# Patient Record
Sex: FEMALE | Race: WHITE | NOT HISPANIC OR LATINO
[De-identification: names, ages, dates, MRNs, and addresses within clinical notes are randomized per-mention and may not be internally consistent; named-entity substitution may affect disease eponyms.]

---
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
## 2024-03-04 ENCOUNTER — WALK IN (OUTPATIENT)
Dept: URGENT CARE | Age: 19
End: 2024-03-04
Attending: EMERGENCY MEDICINE

## 2024-03-04 VITALS
OXYGEN SATURATION: 100 % | DIASTOLIC BLOOD PRESSURE: 79 MMHG | WEIGHT: 100 LBS | TEMPERATURE: 98.7 F | RESPIRATION RATE: 16 BRPM | SYSTOLIC BLOOD PRESSURE: 119 MMHG | HEART RATE: 92 BPM

## 2024-03-04 DIAGNOSIS — J02.9 ACUTE PHARYNGITIS, UNSPECIFIED ETIOLOGY: Primary | ICD-10-CM

## 2024-03-04 DIAGNOSIS — R07.0 THROAT PAIN: ICD-10-CM

## 2024-03-04 LAB
FLUAV RNA RESP QL NAA+PROBE: NOT DETECTED
FLUBV RNA RESP QL NAA+PROBE: NOT DETECTED
RSV AG NPH QL IA.RAPID: NOT DETECTED
S PYO DNA THROAT QL NAA+PROBE: NOT DETECTED
SARS-COV-2 RNA RESP QL NAA+PROBE: NOT DETECTED
TEST LOT EXPIRATION DATE: NORMAL
TEST LOT NUMBER: NORMAL

## 2024-03-04 PROCEDURE — 99202 OFFICE O/P NEW SF 15 MIN: CPT

## 2024-03-04 PROCEDURE — 87651 STREP A DNA AMP PROBE: CPT | Performed by: PHYSICIAN ASSISTANT

## 2024-03-04 PROCEDURE — 0241U POCT COVID/FLU/RSV PANEL: CPT | Performed by: PHYSICIAN ASSISTANT

## 2024-03-04 RX ORDER — ESCITALOPRAM OXALATE 10 MG/1
10 TABLET ORAL DAILY
COMMUNITY
Start: 2024-01-26

## 2024-03-04 ASSESSMENT — PAIN SCALES - GENERAL: PAINLEVEL_OUTOF10: 3

## 2024-10-01 ENCOUNTER — WALK IN (OUTPATIENT)
Dept: URGENT CARE | Age: 19
End: 2024-10-01
Attending: EMERGENCY MEDICINE

## 2024-10-01 VITALS
SYSTOLIC BLOOD PRESSURE: 117 MMHG | HEART RATE: 80 BPM | RESPIRATION RATE: 16 BRPM | OXYGEN SATURATION: 97 % | DIASTOLIC BLOOD PRESSURE: 79 MMHG | TEMPERATURE: 99.4 F

## 2024-10-01 DIAGNOSIS — L73.8 PSEUDOFOLLICULITIS: Primary | ICD-10-CM

## 2024-10-01 PROCEDURE — 99212 OFFICE O/P EST SF 10 MIN: CPT

## 2024-10-01 RX ORDER — MUPIROCIN 20 MG/G
OINTMENT TOPICAL 3 TIMES DAILY
Qty: 22 G | Refills: 1 | Status: SHIPPED | OUTPATIENT
Start: 2024-10-01 | End: 2024-10-08

## 2024-10-01 ASSESSMENT — PAIN SCALES - GENERAL
PAINLEVEL_OUTOF10: 4
PAINLEVEL: 4
PAINLEVEL: 4
PAINLEVEL_OUTOF10: 4

## 2025-03-17 ENCOUNTER — OFFICE VISIT (OUTPATIENT)
Dept: URGENT CARE | Age: 20
End: 2025-03-17
Payer: COMMERCIAL

## 2025-03-17 VITALS
WEIGHT: 103 LBS | SYSTOLIC BLOOD PRESSURE: 113 MMHG | BODY MASS INDEX: 18.25 KG/M2 | OXYGEN SATURATION: 98 % | HEART RATE: 105 BPM | DIASTOLIC BLOOD PRESSURE: 77 MMHG | RESPIRATION RATE: 19 BRPM | TEMPERATURE: 98.2 F | HEIGHT: 63 IN

## 2025-03-17 DIAGNOSIS — J06.0 LARYNGOPHARYNGITIS ACUTE: ICD-10-CM

## 2025-03-17 DIAGNOSIS — J02.9 SORE THROAT: Primary | ICD-10-CM

## 2025-03-17 LAB
POC CORONAVIRUS SARS-COV-2 PCR: NEGATIVE
POC HUMAN RHINOVIRUS PCR: NEGATIVE
POC HUMAN RHINOVIRUS PCR: NEGATIVE
POC INFLUENZA A VIRUS PCR: NEGATIVE
POC INFLUENZA A VIRUS PCR: NEGATIVE
POC INFLUENZA B VIRUS PCR: NEGATIVE
POC INFLUENZA B VIRUS PCR: NEGATIVE
POC RESPIRATORY SYNCYTIAL VIRUS PCR: NEGATIVE
POC RESPIRATORY SYNCYTIAL VIRUS PCR: NEGATIVE
POC STREPTOCOCCUS PYOGENES (GROUP A STREP) PCR: NEGATIVE

## 2025-03-17 PROCEDURE — 99203 OFFICE O/P NEW LOW 30 MIN: CPT | Performed by: SPECIALIST

## 2025-03-17 PROCEDURE — 87631 RESP VIRUS 3-5 TARGETS: CPT | Performed by: SPECIALIST

## 2025-03-17 PROCEDURE — 3008F BODY MASS INDEX DOCD: CPT | Performed by: SPECIALIST

## 2025-03-17 PROCEDURE — 87651 STREP A DNA AMP PROBE: CPT | Performed by: SPECIALIST

## 2025-03-17 PROCEDURE — 1036F TOBACCO NON-USER: CPT | Performed by: SPECIALIST

## 2025-03-17 RX ORDER — ESCITALOPRAM OXALATE 5 MG/5ML
10 SOLUTION ORAL DAILY
COMMUNITY
Start: 2024-12-02

## 2025-03-17 ASSESSMENT — ENCOUNTER SYMPTOMS
SORE THROAT: 1
SINUS COMPLAINT: 1
VOICE CHANGE: 1
COUGH: 1

## 2025-03-17 NOTE — PROGRESS NOTES
"Subjective   Patient ID: Cherelle Desai is a 19 y.o. female. They present today with a chief complaint of Sore Throat, Cough, Earache, and Sinus Problem.    History of Present Illness    Sore Throat   Associated symptoms include congestion, coughing and ear pain.   Cough  Associated symptoms include ear pain and a sore throat.   Earache   Associated symptoms include coughing and a sore throat.   Sinus Problem  Associated symptoms: congestion, cough, ear pain and sore throat        Past Medical History  Allergies as of 03/17/2025 - Reviewed 03/17/2025   Allergen Reaction Noted    Oseltamivir Hallucinations 02/08/2018       (Not in a hospital admission)       No past medical history on file.    No past surgical history on file.     reports that she has never smoked. She has never used smokeless tobacco. She reports that she does not drink alcohol and does not use drugs.    Review of Systems  Review of Systems   HENT:  Positive for congestion, ear pain, sore throat and voice change.    Respiratory:  Positive for cough.                                   Objective    Vitals:    03/17/25 1118   BP: 113/77   Pulse: 105   Resp: 19   Temp: 36.8 °C (98.2 °F)   TempSrc: Oral   SpO2: 98%   Weight: 46.7 kg (103 lb)   Height: 1.6 m (5' 3\")     Patient's last menstrual period was 03/06/2025 (exact date).    Physical Exam  HENT:      Right Ear: Tympanic membrane and ear canal normal.      Left Ear: Tympanic membrane and ear canal normal.      Nose: Nose normal.      Mouth/Throat:      Pharynx: Pharyngeal swelling and posterior oropharyngeal erythema present.      Comments: Mild to moderate swelling of bilateral tonsils  Neurological:      Mental Status: She is alert.         Procedures    Point of Care Test & Imaging Results from this visit  No results found for this visit on 03/17/25.   No results found.    Diagnostic study results (if any) were reviewed by Marsha Boo MD.    Assessment/Plan   Allergies, medications, " history, and pertinent labs/EKGs/Imaging reviewed by Marsha Boo MD.     Medical Decision Making  LaryngoPharyngitis, possibly viral, symptom therapy     Orders and Diagnoses  There are no diagnoses linked to this encounter.    Medical Admin Record      Patient disposition: Home    Electronically signed by Marsha Boo MD  11:38 AM

## 2025-03-17 NOTE — PATIENT INSTRUCTIONS
Fluids   Gargle with warm salt water   400 milligram Ibuprofen as needed up to 3 times a day .   Mucinex as needed for cough up to 2 times a day.

## 2025-04-24 ENCOUNTER — HOSPITAL ENCOUNTER (OUTPATIENT)
Dept: LAB | Age: 20
Discharge: HOME | End: 2025-04-24
Attending: PEDIATRICS

## 2025-04-24 DIAGNOSIS — R07.89 ATYPICAL CHEST PAIN: Primary | ICD-10-CM

## 2025-04-24 DIAGNOSIS — R07.89 ATYPICAL CHEST PAIN: ICD-10-CM

## 2025-04-24 PROCEDURE — 93005 ELECTROCARDIOGRAM TRACING: CPT | Performed by: PEDIATRICS

## 2025-04-25 LAB
ATRIAL RATE (BPM): 77
P AXIS (DEGREES): 61
PR-INTERVAL (MSEC): 128
QRS-INTERVAL (MSEC): 76
QT-INTERVAL (MSEC): 368
QTC: 416
R AXIS (DEGREES): 61
REPORT TEXT: NORMAL
T AXIS (DEGREES): 39
VENTRICULAR RATE EKG/MIN (BPM): 77

## 2025-06-01 ENCOUNTER — HOSPITAL ENCOUNTER (EMERGENCY)
Facility: HOSPITAL | Age: 20
Discharge: HOME | End: 2025-06-01
Attending: EMERGENCY MEDICINE
Payer: COMMERCIAL

## 2025-06-01 ENCOUNTER — APPOINTMENT (OUTPATIENT)
Dept: GENERAL RADIOLOGY | Facility: HOSPITAL | Age: 20
End: 2025-06-01
Attending: EMERGENCY MEDICINE
Payer: COMMERCIAL

## 2025-06-01 VITALS
TEMPERATURE: 98 F | SYSTOLIC BLOOD PRESSURE: 132 MMHG | HEIGHT: 63 IN | HEART RATE: 74 BPM | DIASTOLIC BLOOD PRESSURE: 74 MMHG | WEIGHT: 102 LBS | OXYGEN SATURATION: 100 % | RESPIRATION RATE: 16 BRPM | BODY MASS INDEX: 18.07 KG/M2

## 2025-06-01 DIAGNOSIS — R13.10 DYSPHAGIA, UNSPECIFIED TYPE: Primary | ICD-10-CM

## 2025-06-01 PROCEDURE — 70360 X-RAY EXAM OF NECK: CPT | Performed by: EMERGENCY MEDICINE

## 2025-06-01 PROCEDURE — 99283 EMERGENCY DEPT VISIT LOW MDM: CPT

## 2025-06-01 PROCEDURE — 99284 EMERGENCY DEPT VISIT MOD MDM: CPT

## 2025-06-01 NOTE — DISCHARGE INSTRUCTIONS
I recommend following up with gastroenterology for further evaluation.  Call for appointment  Follow-up with PMD as needed.  Return if increased concerns.

## 2025-06-01 NOTE — ED PROVIDER NOTES
Patient Seen in: [facility] Emergency Department        History  Chief Complaint   Patient presents with    FB in Throat     Stated Complaint: Coughed something up this morning.    Subjective:   HPI            Patient is a 19-year-old who said that she woke up this morning with a sensation of something in the back of her throat and she coughed up a few mucousy pieces of food.  She said she still feels a little discomfort when she swallows now.  Limited to the right of midline.  She has been able to drink water this morning without difficulty.  She has had no significant coughing or shortness of breath.  No fever.    Patient says she has had a problem in the past where she takes too big of a bite of some food it seems to have a difficulty going down completely.  Patient says she has never had a complete food bolus obstruction.    She also says she has had a little bit of problem with GE reflux in the past.  She has never seen a gastroenterologist.      Objective:     History reviewed. No pertinent past medical history.           Past Surgical History:   Procedure Laterality Date    Unlisted proc, laparoscopy, uterus  2021                Social History     Socioeconomic History    Marital status: Single   Tobacco Use    Smoking status: Never    Smokeless tobacco: Never   Substance and Sexual Activity    Alcohol use: Not Currently    Drug use: Not Currently                                Physical Exam    ED Triage Vitals [06/01/25 1318]   /86   Pulse 85   Resp 16   Temp 98.2 °F (36.8 °C)   Temp src Oral   SpO2 99 %   O2 Device None (Room air)       Current Vitals:   Vital Signs  BP: 132/74  Pulse: 74  Resp: 16  Temp: 98.2 °F (36.8 °C)  Temp src: Oral    Oxygen Therapy  SpO2: 100 %  O2 Device: None (Room air)            Physical Exam     GENERAL: Patient is awake, alert, active and interactive.  HEENT: Posterior pharynx shows no erythema or exudate.  Uvula midline.  No drooling or stridor.  Conjunctiva are  clear.  Pupils are equal round reactive to light.    Neck is supple with no pain to movement.  Patient points to an area on the right side lateral to her thyroid cartilage near the anterior cervical lymph nodes running up the sternocleidomastoid.  It is mildly tender there.  There is some small shotty lymph nodes but no significant mass appreciated.  The airway please Duly midline.  CHEST: Patient is breathing comfortably.  Lungs are clear bilaterally  HEART: Regular rate and rhythm no murmur  ABDOMEN: nondistended, nontender.  EXTREMITIES: Normal capillary refill.  SKIN: Well perfused, without cyanosis.  No rashes.  NEUROLOGIC: No focal deficits visualized.      ED Course  Labs Reviewed - No data to display       XR SOFT TISSUE NECK (CPT=70360)  Result Date: 6/1/2025  PROCEDURE:  XR SOFT TISSUE NECK (CPT=70360)  COMPARISON:  None.  INDICATIONS:  Coughed something up this morning.  Foreign body sensation in throat  PATIENT STATED HISTORY: (As transcribed by Technologist)  Patient coughed up some food this morning after waking up, she still has foreign body sensation in her throat.   FINDINGS:   ADENOIDS:  Normal for age. OTHER:  No epiglottic thickening, subglottic narrowing, or prevertebral soft tissue swelling. No radiopaque foreign body. Widely patent airway.            CONCLUSION:  Widely patent airway. No radiopaque foreign body.   LOCATION:  [facility]    Dictated by (CST): Eran Crespo MD on 6/01/2025 at 2:08 PM     Finalized by (CST): Eran Crespo MD on 6/01/2025 at 2:09 PM       I personally reviewed and interpreted the x-rays: X-rays soft tissue neck showed no radiopaque foreign body or signs of airway impingement.           Patient able to drink water easily in the ED.    Patient's symptoms likely consistent with esophagitis.   Possibly acid reflux more likely eosinophilic esophagitis.  Patient's breathing comfortably and swallowing liquids well.  I think the patient safer discharge but I do recommend  following up with gastroenterology for further evaluation.       MDM     Patient was screened and evaluated during this visit.   As a treating physician attending to the patient, I determined, within reasonable clinical confidence and prior to discharge, that an emergency medical condition was not or was no longer present.  There was no indication for further evaluation, treatment or admission on an emergency basis.  Comprehensive verbal and written discharge and follow-up instructions were provided to help prevent relapse or worsening.    Patient was instructed to follow-up with the primary care provider for further evaluation and treatment, but to return immediately to the ER for worsening, concerning, new, changing, or persisting symptoms.    I discussed my assessment and plan and answered all questions prior to discharge.  Patient/family expressed understanding and agreement with the plan.      Patient is alert, interactive, and in no distress upon discharge.    This report has been produced using speech recognition software and may contain errors related to that system including, but not limited to, errors in grammar, punctuation, and spelling, as well as words and phrases that possibly may have been recognized inappropriately.  If there are any questions or concerns, contact the dictating provider for clarification.          Medical Decision Making      Disposition and Plan     Clinical Impression:  1. Dysphagia, unspecified type         Disposition:  Discharge  6/1/2025  2:35 pm    Follow-up:  Gregorio Flowers MD  [address]  [facility] [address] [phone]    Schedule an appointment as soon as possible for a visit      Alana Craig MD  [address] [address] [phone]    Follow up  As needed    [facility] Emergency Department  [address] [facility] [address] [phone]  Follow up  Immediately if symptoms worsen, increased  concerns          Medications Prescribed:  There are no discharge medications for this patient.            Supplementary Documentation:

## 2025-06-01 NOTE — ED INITIAL ASSESSMENT (HPI)
Pt reports waking up this morning and felt liked something was in her throat, coughing aggressively, and then a slimy lump of food came out, states that it tasted like mold, now it feels like something is in her right side of throat. No vomiting.  Pt is aaox4, no respiratory issues. Reports feeling anxious.

## 2025-07-31 ENCOUNTER — LAB SERVICES (OUTPATIENT)
Dept: LAB | Age: 20
End: 2025-07-31

## 2025-07-31 DIAGNOSIS — N92.6 IRREGULAR PERIODS: ICD-10-CM

## 2025-07-31 LAB
ESTRADIOL SERPL-MCNC: 48 PG/ML
FSH SERPL-ACNC: 6.4 MUNITS/ML
PROLACTIN SERPL-MCNC: 18.3 NG/ML (ref 2.8–29.2)
TSH SERPL-ACNC: 2.43 MCUNITS/ML (ref 0.35–5)

## 2025-07-31 PROCEDURE — 83001 ASSAY OF GONADOTROPIN (FSH): CPT | Performed by: CLINICAL MEDICAL LABORATORY

## 2025-07-31 PROCEDURE — 84443 ASSAY THYROID STIM HORMONE: CPT | Performed by: CLINICAL MEDICAL LABORATORY

## 2025-07-31 PROCEDURE — 82627 DEHYDROEPIANDROSTERONE: CPT | Performed by: CLINICAL MEDICAL LABORATORY

## 2025-07-31 PROCEDURE — 84146 ASSAY OF PROLACTIN: CPT | Performed by: CLINICAL MEDICAL LABORATORY

## 2025-07-31 PROCEDURE — 36415 COLL VENOUS BLD VENIPUNCTURE: CPT | Performed by: CLINICAL MEDICAL LABORATORY

## 2025-07-31 PROCEDURE — 82670 ASSAY OF TOTAL ESTRADIOL: CPT | Performed by: CLINICAL MEDICAL LABORATORY

## 2025-07-31 PROCEDURE — 84403 ASSAY OF TOTAL TESTOSTERONE: CPT | Performed by: CLINICAL MEDICAL LABORATORY

## 2025-08-01 LAB — DHEA-S SERPL-MCNC: 384 MCG/DL (ref 8–391)

## 2025-08-05 LAB — TESTOST SERPL-MCNC: 24 NG/DL (ref 9–55)

## (undated) NOTE — ED AVS SNAPSHOT
Cristopher Pelayo   MRN: [id number]    Department:  [facility] Emergency Department   Date of Visit:  5/9/2018           Disclosure     Insurance plans vary and the physician(s) referred by the ER may not be covered by your plan.  Please contact tell this physician (or your personal doctor if your instructions are to return to your personal doctor) about any new or lasting problems. The primary care or specialist physician will see patients referred from the [facility] Emergency Department.  Severo Pastures